# Patient Record
Sex: FEMALE | Race: WHITE | NOT HISPANIC OR LATINO | URBAN - METROPOLITAN AREA
[De-identification: names, ages, dates, MRNs, and addresses within clinical notes are randomized per-mention and may not be internally consistent; named-entity substitution may affect disease eponyms.]

---

## 2017-12-20 PROBLEM — 102614006 GENERALIZED ABDOMINAL PAIN: Status: ACTIVE | Noted: 2017-12-20

## 2017-12-20 PROBLEM — 238131007 OVERWEIGHT: Status: ACTIVE | Noted: 2017-12-20

## 2017-12-20 PROBLEM — 59621000 ESSENTIAL HYPERTENSION: Status: ACTIVE | Noted: 2017-12-20

## 2017-12-20 PROBLEM — 62315008 DIARRHEA: Status: ACTIVE | Noted: 2017-12-20

## 2017-12-20 PROBLEM — 66612000 NUTRITIONAL ANEMIA: Status: ACTIVE | Noted: 2017-12-20

## 2018-03-08 PROBLEM — 274774002 ELEVATED LEVELS OF TRANSAMINASE & LACTIC ACID DEHYDROGENASE: Status: ACTIVE | Noted: 2018-03-08

## 2021-02-17 ENCOUNTER — OFFICE VISIT (OUTPATIENT)
Dept: URBAN - METROPOLITAN AREA CLINIC 27 | Facility: CLINIC | Age: 71
End: 2021-02-17

## 2021-02-19 ENCOUNTER — OUT OF OFFICE VISIT (OUTPATIENT)
Dept: URBAN - METROPOLITAN AREA MEDICAL CENTER 8 | Facility: MEDICAL CENTER | Age: 71
End: 2021-02-19
Payer: MEDICARE

## 2021-02-19 DIAGNOSIS — R11.0 CHRONIC NAUSEA: ICD-10-CM

## 2021-02-19 DIAGNOSIS — I26.99 ACUTE PULMONARY EMBOLISM WITHOUT ACUTE COR PULMONALE, UNSPECIFIED PULMONARY EMBOLISM TYPE: ICD-10-CM

## 2021-02-19 DIAGNOSIS — R18.8 ABDOMINAL FLUID COLLECTION: ICD-10-CM

## 2021-02-19 PROCEDURE — 99222 1ST HOSP IP/OBS MODERATE 55: CPT | Performed by: INTERNAL MEDICINE

## 2021-02-19 PROCEDURE — G8427 DOCREV CUR MEDS BY ELIG CLIN: HCPCS | Performed by: INTERNAL MEDICINE

## 2021-02-20 ENCOUNTER — OUT OF OFFICE VISIT (OUTPATIENT)
Dept: URBAN - METROPOLITAN AREA MEDICAL CENTER 8 | Facility: MEDICAL CENTER | Age: 71
End: 2021-02-20
Payer: MEDICARE

## 2021-02-20 DIAGNOSIS — R18.8 ABDOMINAL FLUID COLLECTION: ICD-10-CM

## 2021-02-20 PROCEDURE — 99232 SBSQ HOSP IP/OBS MODERATE 35: CPT | Performed by: INTERNAL MEDICINE

## 2021-02-21 ENCOUNTER — OUT OF OFFICE VISIT (OUTPATIENT)
Dept: URBAN - METROPOLITAN AREA MEDICAL CENTER 8 | Facility: MEDICAL CENTER | Age: 71
End: 2021-02-21
Payer: MEDICARE

## 2021-02-21 DIAGNOSIS — R93.3 ABN FINDINGS-GI TRACT: ICD-10-CM

## 2021-02-21 DIAGNOSIS — R18.8 ABDOMINAL FLUID COLLECTION: ICD-10-CM

## 2021-02-21 PROCEDURE — 99232 SBSQ HOSP IP/OBS MODERATE 35: CPT | Performed by: INTERNAL MEDICINE

## 2022-04-30 ENCOUNTER — TELEPHONE ENCOUNTER (OUTPATIENT)
Dept: URBAN - METROPOLITAN AREA CLINIC 121 | Facility: CLINIC | Age: 72
End: 2022-04-30

## 2022-04-30 RX ORDER — RIFAXIMIN 550 MG/1
1 TABLET PO TID X 14 DAYS TABLET ORAL
OUTPATIENT
Start: 2018-01-05 | End: 2018-01-19

## 2022-05-01 ENCOUNTER — TELEPHONE ENCOUNTER (OUTPATIENT)
Dept: URBAN - METROPOLITAN AREA CLINIC 121 | Facility: CLINIC | Age: 72
End: 2022-05-01

## 2022-05-01 RX ORDER — CLONAZEPAM 0.5 MG/1
TABLET ORAL
Status: ACTIVE | COMMUNITY
Start: 2017-12-20

## 2022-05-01 RX ORDER — NEBIVOLOL HYDROCHLORIDE 10 MG/1
TABLET ORAL
Status: ACTIVE | COMMUNITY
Start: 2017-12-20

## 2022-05-01 RX ORDER — OMEGA-3 FATTY ACIDS/FISH OIL 360-1200MG
CAPSULE ORAL
Status: ACTIVE | COMMUNITY
Start: 2017-12-20

## 2022-05-01 RX ORDER — PANTOPRAZOLE SODIUM 40 MG/1
TABLET, DELAYED RELEASE ORAL
Status: ACTIVE | COMMUNITY
Start: 2017-12-20

## 2022-05-01 RX ORDER — IBUPROFEN 800 MG/1
TABLET, FILM COATED ORAL
Status: ACTIVE | COMMUNITY
Start: 2017-12-20

## 2022-05-01 RX ORDER — RIFAXIMIN 550 MG/1
TAKE 1 TABLET TID FOR 14 DAYS TABLET ORAL
Status: ACTIVE | COMMUNITY
Start: 2017-12-20

## 2022-05-01 RX ORDER — OMEPRAZOLE 40 MG/1
1 CAPSULE PO QD CAPSULE, DELAYED RELEASE ORAL
Status: ACTIVE | COMMUNITY
Start: 2018-03-08

## 2022-05-01 RX ORDER — TRAMADOL HYDROCHLORIDE 50 MG/1
TABLET, FILM COATED ORAL
Status: ACTIVE | COMMUNITY
Start: 2017-12-20

## 2022-05-01 RX ORDER — RIZATRIPTAN BENZOATE ODT 10 MG/1
TABLET, ORALLY DISINTEGRATING ORAL
Status: ACTIVE | COMMUNITY
Start: 2017-12-20

## 2022-05-01 RX ORDER — BETA 1,3 GLUCAN 91 %
POWDER (GRAM) MISCELLANEOUS
Status: ACTIVE | COMMUNITY
Start: 2017-12-20

## 2022-05-01 RX ORDER — MV-MIN/FOLIC/VIT K/LUT/HERB293 240-120MCG
TABLET ORAL
Status: ACTIVE | COMMUNITY
Start: 2017-12-20

## 2022-05-01 RX ORDER — UBIDECARENONE 200 MG
CAPSULE ORAL
Status: ACTIVE | COMMUNITY
Start: 2017-12-20

## 2023-05-11 ENCOUNTER — OFFICE VISIT (OUTPATIENT)
Dept: URBAN - METROPOLITAN AREA CLINIC 27 | Facility: CLINIC | Age: 73
End: 2023-05-11
Payer: MEDICARE

## 2023-05-11 ENCOUNTER — WEB ENCOUNTER (OUTPATIENT)
Dept: URBAN - METROPOLITAN AREA CLINIC 27 | Facility: CLINIC | Age: 73
End: 2023-05-11

## 2023-05-11 VITALS
HEIGHT: 72 IN | HEART RATE: 76 BPM | WEIGHT: 168 LBS | DIASTOLIC BLOOD PRESSURE: 75 MMHG | BODY MASS INDEX: 22.75 KG/M2 | SYSTOLIC BLOOD PRESSURE: 127 MMHG

## 2023-05-11 DIAGNOSIS — K58.0 IRRITABLE BOWEL SYNDROME WITH DIARRHEA: ICD-10-CM

## 2023-05-11 PROBLEM — 197125005: Status: ACTIVE | Noted: 2023-05-11

## 2023-05-11 PROCEDURE — 99214 OFFICE O/P EST MOD 30 MIN: CPT | Performed by: INTERNAL MEDICINE

## 2023-05-11 RX ORDER — OMEPRAZOLE 40 MG/1
1 CAPSULE PO QD CAPSULE, DELAYED RELEASE ORAL
Status: DISCONTINUED | COMMUNITY
Start: 2018-03-08

## 2023-05-11 RX ORDER — BETA 1,3 GLUCAN 91 %
POWDER (GRAM) MISCELLANEOUS
Status: DISCONTINUED | COMMUNITY
Start: 2017-12-20

## 2023-05-11 RX ORDER — RIFAXIMIN 550 MG/1
TAKE 1 TABLET TID FOR 14 DAYS TABLET ORAL
Status: DISCONTINUED | COMMUNITY
Start: 2017-12-20

## 2023-05-11 RX ORDER — TRAMADOL HYDROCHLORIDE 50 MG/1
TABLET, FILM COATED ORAL
Status: ACTIVE | COMMUNITY
Start: 2017-12-20

## 2023-05-11 RX ORDER — MV-MIN/FOLIC/VIT K/LUT/HERB293 240-120MCG
TABLET ORAL
Status: ACTIVE | COMMUNITY
Start: 2017-12-20

## 2023-05-11 RX ORDER — IBUPROFEN 800 MG/1
TABLET, FILM COATED ORAL
Status: DISCONTINUED | COMMUNITY
Start: 2017-12-20

## 2023-05-11 RX ORDER — CLONAZEPAM 0.5 MG/1
TABLET ORAL
Status: ACTIVE | COMMUNITY
Start: 2017-12-20

## 2023-05-11 RX ORDER — UBIDECARENONE 200 MG
CAPSULE ORAL
Status: DISCONTINUED | COMMUNITY
Start: 2017-12-20

## 2023-05-11 RX ORDER — RIZATRIPTAN BENZOATE ODT 10 MG/1
TABLET, ORALLY DISINTEGRATING ORAL
Status: DISCONTINUED | COMMUNITY
Start: 2017-12-20

## 2023-05-11 RX ORDER — OMEGA-3 FATTY ACIDS/FISH OIL 360-1200MG
CAPSULE ORAL
Status: DISCONTINUED | COMMUNITY
Start: 2017-12-20

## 2023-05-11 RX ORDER — NEBIVOLOL HYDROCHLORIDE 5 MG/1
1 TABLET TABLET ORAL
Status: ACTIVE | COMMUNITY
Start: 2017-12-20

## 2023-05-11 RX ORDER — ATORVASTATIN CALCIUM 20 MG/1
1 TABLET TABLET, FILM COATED ORAL ONCE A DAY
Status: ACTIVE | COMMUNITY

## 2023-05-11 RX ORDER — RIFAXIMIN 550 MG/1
1 TABLET TABLET ORAL THREE TIMES A DAY
Qty: 42 TABLET | Refills: 2 | OUTPATIENT
Start: 2023-05-11 | End: 2023-06-22

## 2023-05-11 RX ORDER — PANTOPRAZOLE SODIUM 20 MG/1
1 TABLET TABLET, DELAYED RELEASE ORAL
Status: ACTIVE | COMMUNITY
Start: 2017-12-20

## 2023-05-11 NOTE — HPI-TODAY'S VISIT:
72-year-old female here for follow-up of IBS.  She has a history of IBS with diarrhea predominance, previously improved with Xifaxan.  She started to get her typical IBS symptoms of abdominal discomfort and diarrhea about 3 days ago.  She started Imodium and IBgard and symptoms have improved somewhat.  Denies rectal bleeding.  She is on chemotherapy for ovarian cancer every 28 days.  Symptoms started about 3 days prior to her chemotherapy that she received on Wednesday.

## 2023-05-18 ENCOUNTER — OFFICE VISIT (OUTPATIENT)
Dept: URBAN - METROPOLITAN AREA TELEHEALTH 2 | Facility: TELEHEALTH | Age: 73
End: 2023-05-18
Payer: MEDICARE

## 2023-05-18 ENCOUNTER — TELEPHONE ENCOUNTER (OUTPATIENT)
Dept: URBAN - METROPOLITAN AREA CLINIC 27 | Facility: CLINIC | Age: 73
End: 2023-05-18

## 2023-05-18 DIAGNOSIS — R19.7 ACUTE DIARRHEA: ICD-10-CM

## 2023-05-18 PROCEDURE — 99214 OFFICE O/P EST MOD 30 MIN: CPT | Performed by: INTERNAL MEDICINE

## 2023-05-18 RX ORDER — RIFAXIMIN 550 MG/1
1 TABLET TABLET ORAL THREE TIMES A DAY
Qty: 42 TABLET | Refills: 2 | Status: ACTIVE | COMMUNITY
Start: 2023-05-11 | End: 2023-06-22

## 2023-05-18 RX ORDER — MV-MIN/FOLIC/VIT K/LUT/HERB293 240-120MCG
TABLET ORAL
Status: ACTIVE | COMMUNITY
Start: 2017-12-20

## 2023-05-18 RX ORDER — PANTOPRAZOLE SODIUM 20 MG/1
1 TABLET TABLET, DELAYED RELEASE ORAL
Status: ACTIVE | COMMUNITY
Start: 2017-12-20

## 2023-05-18 RX ORDER — CLONAZEPAM 0.5 MG/1
TABLET ORAL
Status: ACTIVE | COMMUNITY
Start: 2017-12-20

## 2023-05-18 RX ORDER — ATORVASTATIN CALCIUM 20 MG/1
1 TABLET TABLET, FILM COATED ORAL ONCE A DAY
Status: ACTIVE | COMMUNITY

## 2023-05-18 RX ORDER — DIPHENOXYLATE HYDROCHLORIDE AND ATROPINE SULFATE 2.5; .025 MG/1; MG/1
1 TABLET AS NEEDED TABLET ORAL
Qty: 50 | Refills: 0 | OUTPATIENT
Start: 2023-05-18 | End: 2023-06-17

## 2023-05-18 RX ORDER — TRAMADOL HYDROCHLORIDE 50 MG/1
TABLET, FILM COATED ORAL
Status: ACTIVE | COMMUNITY
Start: 2017-12-20

## 2023-05-18 RX ORDER — DICYCLOMINE HYDROCHLORIDE 10 MG/1
1-2 CAPSULES CAPSULE ORAL
Qty: 90 | Refills: 3 | OUTPATIENT
Start: 2023-05-18 | End: 2023-09-15

## 2023-05-18 RX ORDER — NEBIVOLOL HYDROCHLORIDE 5 MG/1
1 TABLET TABLET ORAL
Status: ACTIVE | COMMUNITY
Start: 2017-12-20

## 2023-05-18 NOTE — HPI-TODAY'S VISIT:
71 yo female presents for worsening abdominal pain, diarrhea. Tried OTC pepto,imodium without much improve symptoms. No blood in stool. No fevers No sick contacts. She is on chemo. Just started xifaxan for likely IBS but no improvement. Worsening symptoms started before she started xifaxan.

## 2023-05-26 ENCOUNTER — TELEPHONE ENCOUNTER (OUTPATIENT)
Dept: URBAN - METROPOLITAN AREA CLINIC 27 | Facility: CLINIC | Age: 73
End: 2023-05-26

## 2023-06-07 ENCOUNTER — OFFICE VISIT (OUTPATIENT)
Dept: URBAN - METROPOLITAN AREA TELEHEALTH 2 | Facility: TELEHEALTH | Age: 73
End: 2023-06-07

## 2023-11-07 ENCOUNTER — LAB OUTSIDE AN ENCOUNTER (OUTPATIENT)
Dept: URBAN - METROPOLITAN AREA CLINIC 27 | Facility: CLINIC | Age: 73
End: 2023-11-07

## 2023-11-07 ENCOUNTER — DASHBOARD ENCOUNTERS (OUTPATIENT)
Age: 73
End: 2023-11-07

## 2023-11-07 ENCOUNTER — OFFICE VISIT (OUTPATIENT)
Dept: URBAN - METROPOLITAN AREA CLINIC 27 | Facility: CLINIC | Age: 73
End: 2023-11-07
Payer: MEDICARE

## 2023-11-07 VITALS
WEIGHT: 172 LBS | DIASTOLIC BLOOD PRESSURE: 68 MMHG | HEIGHT: 72 IN | BODY MASS INDEX: 23.3 KG/M2 | SYSTOLIC BLOOD PRESSURE: 123 MMHG | HEART RATE: 87 BPM

## 2023-11-07 DIAGNOSIS — R19.5 CLAY-COLORED STOOLS: ICD-10-CM

## 2023-11-07 PROCEDURE — 99214 OFFICE O/P EST MOD 30 MIN: CPT | Performed by: INTERNAL MEDICINE

## 2023-11-07 RX ORDER — NEBIVOLOL HYDROCHLORIDE 5 MG/1
1 TABLET TABLET ORAL
Status: ACTIVE | COMMUNITY
Start: 2017-12-20

## 2023-11-07 RX ORDER — MV-MIN/FOLIC/VIT K/LUT/HERB293 240-120MCG
TABLET ORAL
Status: ACTIVE | COMMUNITY
Start: 2017-12-20

## 2023-11-07 RX ORDER — PANTOPRAZOLE SODIUM 20 MG/1
1 TABLET TABLET, DELAYED RELEASE ORAL
Status: ACTIVE | COMMUNITY
Start: 2017-12-20

## 2023-11-07 RX ORDER — TRAMADOL HYDROCHLORIDE 50 MG/1
TABLET, FILM COATED ORAL
Status: ACTIVE | COMMUNITY
Start: 2017-12-20

## 2023-11-07 RX ORDER — ATORVASTATIN CALCIUM 20 MG/1
1 TABLET TABLET, FILM COATED ORAL ONCE A DAY
Status: ACTIVE | COMMUNITY

## 2023-11-07 RX ORDER — CLONAZEPAM 0.5 MG/1
TABLET ORAL
Status: ACTIVE | COMMUNITY
Start: 2017-12-20

## 2023-11-07 NOTE — HPI-TODAY'S VISIT:
72-year-old female with history of ovarian cancer here for change in bowel color, dark urine.  She was having light-colored stool for several weeks, but over the last several days have had very dark urine as well.  She was recently started on chemo again in April and soon afterward got a bone infection treated with IV daptomycin.  She then got a fungal infection and was treated with fluconazole.  She also has a history of a neuroendocrine tumor of the pancreas.

## 2023-11-08 LAB
A/G RATIO: 1.3
ABSOLUTE BASOPHILS: 0
ABSOLUTE EOSINOPHILS: 0
ABSOLUTE LYMPHOCYTES: 329
ABSOLUTE MONOCYTES: 846
ABSOLUTE NEUTROPHILS: 3525
ALBUMIN: 4
ALKALINE PHOSPHATASE: 185
ALT (SGPT): 70
AST (SGOT): 114
BASOPHILS: 0
BILIRUBIN, TOTAL: 1.2
BUN/CREATININE RATIO: (no result)
BUN: 15
CALCIUM: 9
CARBON DIOXIDE, TOTAL: 28
CHLORIDE: 96
CREATININE: 0.93
EGFR: 65
EOSINOPHILS: 0
GLOBULIN, TOTAL: 3.2
GLUCOSE: 111
HEMATOCRIT: 35.1
HEMOGLOBIN: 12.7
LYMPHOCYTES: 7
MCH: 38.1
MCHC: 36.2
MCV: 105.4
MONOCYTES: 18
MPV: 12.3
NEUTROPHILS: 75
NOTE: (no result)
PLATELET COUNT: 102
POTASSIUM: 4
PROTEIN, TOTAL: 7.2
RDW: 11.7
RED BLOOD CELL COUNT: 3.33
SODIUM: 135
WHITE BLOOD CELL COUNT: 4.7

## 2024-01-08 ENCOUNTER — OFFICE VISIT (OUTPATIENT)
Dept: URBAN - METROPOLITAN AREA CLINIC 27 | Facility: CLINIC | Age: 74
End: 2024-01-08

## 2024-05-29 ENCOUNTER — OFFICE VISIT (OUTPATIENT)
Dept: URBAN - METROPOLITAN AREA CLINIC 27 | Facility: CLINIC | Age: 74
End: 2024-05-29
Payer: COMMERCIAL

## 2024-05-29 ENCOUNTER — LAB OUTSIDE AN ENCOUNTER (OUTPATIENT)
Dept: URBAN - METROPOLITAN AREA CLINIC 27 | Facility: CLINIC | Age: 74
End: 2024-05-29

## 2024-05-29 VITALS
SYSTOLIC BLOOD PRESSURE: 124 MMHG | RESPIRATION RATE: 17 BRPM | HEIGHT: 72 IN | HEART RATE: 87 BPM | BODY MASS INDEX: 23.43 KG/M2 | WEIGHT: 173 LBS | DIASTOLIC BLOOD PRESSURE: 68 MMHG

## 2024-05-29 DIAGNOSIS — R13.19 ESOPHAGEAL DYSPHAGIA: ICD-10-CM

## 2024-05-29 PROCEDURE — 99214 OFFICE O/P EST MOD 30 MIN: CPT | Performed by: INTERNAL MEDICINE

## 2024-05-29 RX ORDER — TRAMADOL HYDROCHLORIDE 50 MG/1
TABLET, FILM COATED ORAL
Status: ACTIVE | COMMUNITY
Start: 2017-12-20

## 2024-05-29 RX ORDER — NEBIVOLOL HYDROCHLORIDE 5 MG/1
1 TABLET TABLET ORAL
Status: ACTIVE | COMMUNITY
Start: 2017-12-20

## 2024-05-29 RX ORDER — CLONAZEPAM 0.5 MG/1
TABLET ORAL
Status: ACTIVE | COMMUNITY
Start: 2017-12-20

## 2024-05-29 RX ORDER — ATORVASTATIN CALCIUM 20 MG/1
1 TABLET TABLET, FILM COATED ORAL ONCE A DAY
Status: ACTIVE | COMMUNITY

## 2024-05-29 RX ORDER — MV-MIN/FOLIC/VIT K/LUT/HERB293 240-120MCG
TABLET ORAL
Status: ACTIVE | COMMUNITY
Start: 2017-12-20

## 2024-05-29 RX ORDER — PANTOPRAZOLE SODIUM 20 MG/1
1 TABLET TABLET, DELAYED RELEASE ORAL
Status: ACTIVE | COMMUNITY
Start: 2017-12-20

## 2024-05-30 ENCOUNTER — OFFICE VISIT (OUTPATIENT)
Dept: URBAN - METROPOLITAN AREA SURGERY CENTER 7 | Facility: SURGERY CENTER | Age: 74
End: 2024-05-30

## 2024-05-30 RX ORDER — MV-MIN/FOLIC/VIT K/LUT/HERB293 240-120MCG
TABLET ORAL
Status: ACTIVE | COMMUNITY
Start: 2017-12-20

## 2024-05-30 RX ORDER — CLONAZEPAM 0.5 MG/1
TABLET ORAL
Status: ACTIVE | COMMUNITY
Start: 2017-12-20

## 2024-05-30 RX ORDER — TRAMADOL HYDROCHLORIDE 50 MG/1
TABLET, FILM COATED ORAL
Status: ACTIVE | COMMUNITY
Start: 2017-12-20

## 2024-05-30 RX ORDER — PANTOPRAZOLE SODIUM 20 MG/1
1 TABLET TABLET, DELAYED RELEASE ORAL
Status: ACTIVE | COMMUNITY
Start: 2017-12-20

## 2024-05-30 RX ORDER — NEBIVOLOL HYDROCHLORIDE 5 MG/1
1 TABLET TABLET ORAL
Status: ACTIVE | COMMUNITY
Start: 2017-12-20

## 2024-05-30 RX ORDER — ATORVASTATIN CALCIUM 20 MG/1
1 TABLET TABLET, FILM COATED ORAL ONCE A DAY
Status: ACTIVE | COMMUNITY

## 2024-06-19 ENCOUNTER — LAB OUTSIDE AN ENCOUNTER (OUTPATIENT)
Dept: URBAN - METROPOLITAN AREA CLINIC 27 | Facility: CLINIC | Age: 74
End: 2024-06-19

## 2024-06-19 ENCOUNTER — OFFICE VISIT (OUTPATIENT)
Dept: URBAN - METROPOLITAN AREA CLINIC 27 | Facility: CLINIC | Age: 74
End: 2024-06-19
Payer: COMMERCIAL

## 2024-06-19 VITALS
HEIGHT: 72 IN | WEIGHT: 175 LBS | SYSTOLIC BLOOD PRESSURE: 144 MMHG | HEART RATE: 78 BPM | DIASTOLIC BLOOD PRESSURE: 83 MMHG | BODY MASS INDEX: 23.7 KG/M2

## 2024-06-19 DIAGNOSIS — R10.13 EPIGASTRIC PAIN: ICD-10-CM

## 2024-06-19 PROCEDURE — 99214 OFFICE O/P EST MOD 30 MIN: CPT | Performed by: INTERNAL MEDICINE

## 2024-06-19 RX ORDER — DICYCLOMINE HYDROCHLORIDE 10 MG/1
1-2 CAPSULES CAPSULE ORAL
Qty: 90 | Refills: 3 | OUTPATIENT
Start: 2024-06-19 | End: 2024-10-17

## 2024-06-19 RX ORDER — ATORVASTATIN CALCIUM 20 MG/1
1 TABLET TABLET, FILM COATED ORAL ONCE A DAY
Status: ACTIVE | COMMUNITY

## 2024-06-19 RX ORDER — NEBIVOLOL HYDROCHLORIDE 5 MG/1
1 TABLET TABLET ORAL
Status: ACTIVE | COMMUNITY
Start: 2017-12-20

## 2024-06-19 RX ORDER — PANTOPRAZOLE SODIUM 20 MG/1
1 TABLET TABLET, DELAYED RELEASE ORAL
Status: ACTIVE | COMMUNITY
Start: 2017-12-20

## 2024-06-19 RX ORDER — TRAMADOL HYDROCHLORIDE 50 MG/1
TABLET, FILM COATED ORAL
Status: ACTIVE | COMMUNITY
Start: 2017-12-20

## 2024-06-19 RX ORDER — CLONAZEPAM 0.5 MG/1
TABLET ORAL
Status: ACTIVE | COMMUNITY
Start: 2017-12-20

## 2024-06-19 RX ORDER — MV-MIN/FOLIC/VIT K/LUT/HERB293 240-120MCG
TABLET ORAL
Status: ACTIVE | COMMUNITY
Start: 2017-12-20

## 2024-06-19 NOTE — HPI-TODAY'S VISIT:
73-year-old female here for epigastric discomfort, nausea, vomiting.  She had an EGD for dysphagia on May 30 that showed a moderate amount of food still present in the stomach.  Biopsies of the stomach showed mild reactive gastropathy, no H. pylori.  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

## 2024-06-25 ENCOUNTER — TELEPHONE ENCOUNTER (OUTPATIENT)
Dept: URBAN - METROPOLITAN AREA CLINIC 27 | Facility: CLINIC | Age: 74
End: 2024-06-25

## 2024-06-25 RX ORDER — LORAZEPAM 1 MG/1
1 TABLET AS NEEDED FOR PROCEDURE TABLET ORAL ONCE A DAY
Qty: 1 TABLET | Refills: 0 | OUTPATIENT
Start: 2024-06-26

## 2024-07-25 ENCOUNTER — TELEPHONE ENCOUNTER (OUTPATIENT)
Dept: URBAN - METROPOLITAN AREA CLINIC 27 | Facility: CLINIC | Age: 74
End: 2024-07-25

## 2024-07-25 ENCOUNTER — OFFICE VISIT (OUTPATIENT)
Dept: URBAN - METROPOLITAN AREA TELEHEALTH 2 | Facility: TELEHEALTH | Age: 74
End: 2024-07-25

## 2024-07-25 RX ORDER — LORAZEPAM 1 MG/1
1 TABLET AS NEEDED FOR PROCEDURE TABLET ORAL ONCE A DAY
Qty: 1 TABLET | Refills: 0 | Status: ACTIVE | COMMUNITY
Start: 2024-06-26

## 2024-07-25 RX ORDER — PANTOPRAZOLE SODIUM 20 MG/1
1 TABLET TABLET, DELAYED RELEASE ORAL
Status: ACTIVE | COMMUNITY
Start: 2017-12-20

## 2024-07-25 RX ORDER — NEBIVOLOL HYDROCHLORIDE 5 MG/1
1 TABLET TABLET ORAL
Status: ACTIVE | COMMUNITY
Start: 2017-12-20

## 2024-07-25 RX ORDER — TRAMADOL HYDROCHLORIDE 50 MG/1
TABLET, FILM COATED ORAL
Status: ACTIVE | COMMUNITY
Start: 2017-12-20

## 2024-07-25 RX ORDER — MV-MIN/FOLIC/VIT K/LUT/HERB293 240-120MCG
TABLET ORAL
Status: ACTIVE | COMMUNITY
Start: 2017-12-20

## 2024-07-25 RX ORDER — DICYCLOMINE HYDROCHLORIDE 10 MG/1
1-2 CAPSULES CAPSULE ORAL
Qty: 90 | Refills: 3 | Status: ACTIVE | COMMUNITY
Start: 2024-06-19 | End: 2024-10-17

## 2024-07-25 RX ORDER — ATORVASTATIN CALCIUM 20 MG/1
1 TABLET TABLET, FILM COATED ORAL ONCE A DAY
Status: ACTIVE | COMMUNITY

## 2024-07-25 RX ORDER — CLONAZEPAM 0.5 MG/1
TABLET ORAL
Status: ACTIVE | COMMUNITY
Start: 2017-12-20

## 2024-07-31 ENCOUNTER — OFFICE VISIT (OUTPATIENT)
Dept: URBAN - METROPOLITAN AREA TELEHEALTH 2 | Facility: TELEHEALTH | Age: 74
End: 2024-07-31
Payer: COMMERCIAL

## 2024-07-31 DIAGNOSIS — R10.84 GENERALIZED ABDOMINAL DISCOMFORT: ICD-10-CM

## 2024-07-31 PROCEDURE — 99213 OFFICE O/P EST LOW 20 MIN: CPT | Performed by: INTERNAL MEDICINE

## 2024-07-31 RX ORDER — CLONAZEPAM 0.5 MG/1
TABLET ORAL
Status: ACTIVE | COMMUNITY
Start: 2017-12-20

## 2024-07-31 RX ORDER — TRAMADOL HYDROCHLORIDE 50 MG/1
TABLET, FILM COATED ORAL
Status: ACTIVE | COMMUNITY
Start: 2017-12-20

## 2024-07-31 RX ORDER — DICYCLOMINE HYDROCHLORIDE 10 MG/1
1-2 CAPSULES CAPSULE ORAL
Qty: 90 | Refills: 3 | OUTPATIENT
Start: 2024-07-31 | End: 2024-11-27

## 2024-07-31 RX ORDER — ATORVASTATIN CALCIUM 20 MG/1
1 TABLET TABLET, FILM COATED ORAL ONCE A DAY
Status: ACTIVE | COMMUNITY

## 2024-07-31 RX ORDER — LORAZEPAM 1 MG/1
1 TABLET AS NEEDED FOR PROCEDURE TABLET ORAL ONCE A DAY
Qty: 1 TABLET | Refills: 0 | Status: ACTIVE | COMMUNITY
Start: 2024-06-26

## 2024-07-31 RX ORDER — DICYCLOMINE HYDROCHLORIDE 10 MG/1
1-2 CAPSULES CAPSULE ORAL
Qty: 90 | Refills: 3 | Status: ACTIVE | COMMUNITY
Start: 2024-06-19 | End: 2024-10-17

## 2024-07-31 RX ORDER — PANTOPRAZOLE SODIUM 20 MG/1
1 TABLET TABLET, DELAYED RELEASE ORAL
Status: ACTIVE | COMMUNITY
Start: 2017-12-20

## 2024-07-31 RX ORDER — MV-MIN/FOLIC/VIT K/LUT/HERB293 240-120MCG
TABLET ORAL
Status: ACTIVE | COMMUNITY
Start: 2017-12-20

## 2024-07-31 RX ORDER — NEBIVOLOL HYDROCHLORIDE 5 MG/1
1 TABLET TABLET ORAL
Status: ACTIVE | COMMUNITY
Start: 2017-12-20

## 2024-07-31 NOTE — HPI-TODAY'S VISIT:
72 yo female presents for follow up of abdominal pain, n/v. She gets episodes that start with nausea, then has some abdominal discomfort. Eventually she vomits and feels better. Symptoms last all day until she vomits. Last episode was months ago.